# Patient Record
Sex: FEMALE | Race: BLACK OR AFRICAN AMERICAN | Employment: FULL TIME | URBAN - METROPOLITAN AREA
[De-identification: names, ages, dates, MRNs, and addresses within clinical notes are randomized per-mention and may not be internally consistent; named-entity substitution may affect disease eponyms.]

---

## 2017-12-04 ENCOUNTER — OFFICE VISIT (OUTPATIENT)
Dept: INTERNAL MEDICINE CLINIC | Age: 32
End: 2017-12-04

## 2017-12-04 VITALS
SYSTOLIC BLOOD PRESSURE: 124 MMHG | BODY MASS INDEX: 37.05 KG/M2 | OXYGEN SATURATION: 100 % | DIASTOLIC BLOOD PRESSURE: 72 MMHG | HEIGHT: 64 IN | RESPIRATION RATE: 18 BRPM | HEART RATE: 67 BPM | WEIGHT: 217 LBS | TEMPERATURE: 98.2 F

## 2017-12-04 DIAGNOSIS — Z23 ENCOUNTER FOR IMMUNIZATION: ICD-10-CM

## 2017-12-04 DIAGNOSIS — Z00.00 PHYSICAL EXAM, ANNUAL: Primary | ICD-10-CM

## 2017-12-04 LAB
HCG URINE, QL. (POC): POSITIVE
VALID INTERNAL CONTROL?: YES

## 2017-12-04 RX ORDER — NORGESTIMATE AND ETHINYL ESTRADIOL 0.25-0.035
1 KIT ORAL DAILY
COMMUNITY
End: 2022-10-21

## 2017-12-04 NOTE — PROGRESS NOTES
HISTORY OF PRESENT ILLNESS  Crissy Kumar is a 27 y.o. female. HPI   Last here 17. Pt is here for routine care. BP is 124/72    Wt is down 19 lbs since last visit   Congratulated pt on this   Pt states that she is actually up 12 lbs   Pt has been walking in the parking lot at work  Pt has been eating more fruits and vegetables  Discussed continued diet and w/l    Reviewed labs   Discussed her LDL being minimally elevated   Discussed her a1c being in the pre-diabetic ranges  Discussed possibly having an improved LDL and a1c with her recent w/l   Will get labs today     Pt completed a course of vit D 50K weekly  Pt has not started a daily vit D  Discussed low vit D levels contributing to thinning of the bones   Advised her to take vit D OTC 2000U daily     Pt c/o lethargy and menstrual spotting/bleeding  Pt has been taking sprintec birth control   However, pt states that she may be pregnant  Will get pregnancy test today     Pt no longer smokes cigarettes       PREVENTIVE:  Pap: Dr. Melissa Recinos, , appt pending for   Mammogram; not yet needed  Dexa: not yet needed  Tdap: 16  Eye exam:   Flu shot: 17   Lipids:    A1c:  5.7    There are no active problems to display for this patient. Current Outpatient Prescriptions   Medication Sig Dispense Refill    norgestimate-ethinyl estradiol (MONONESSA, 28,) 0.25-35 mg-mcg tab Take 1 Tab by mouth daily.        Past Surgical History:   Procedure Laterality Date    HX  SECTION      HX WISDOM TEETH EXTRACTION        Lab Results  Component Value Date/Time   WBC 6.3 2016 10:15 AM   HGB 11.9 2016 10:15 AM   HCT 36.2 2016 10:15 AM   PLATELET 086 2937 10:15 AM   MCV 89 2016 10:15 AM     Lab Results  Component Value Date/Time   Cholesterol, total 248 2016 10:15 AM   HDL Cholesterol 117 2016 10:15 AM   LDL, calculated 117 2016 10:15 AM   Triglyceride 71 2016 10:15 AM     Lab Results  Component Value Date/Time   GFR est non- 12/02/2016 10:15 AM   GFR est  12/02/2016 10:15 AM   Creatinine 0.71 12/02/2016 10:15 AM   BUN 11 12/02/2016 10:15 AM   Sodium 141 12/02/2016 10:15 AM   Potassium 4.9 12/02/2016 10:15 AM   Chloride 101 12/02/2016 10:15 AM   CO2 25 12/02/2016 10:15 AM          Review of Systems   Constitutional: Positive for malaise/fatigue. Respiratory: Negative for shortness of breath. Cardiovascular: Negative for chest pain. Physical Exam   Constitutional: She is oriented to person, place, and time. She appears well-developed and well-nourished. No distress. HENT:   Head: Normocephalic and atraumatic. Right Ear: External ear normal.   Left Ear: External ear normal.   Mouth/Throat: Oropharynx is clear and moist. No oropharyngeal exudate. Eyes: Conjunctivae and EOM are normal. Pupils are equal, round, and reactive to light. Right eye exhibits no discharge. Left eye exhibits no discharge. No scleral icterus. Neck: Normal range of motion. Neck supple. No carotid bruits    Cardiovascular: Normal rate, regular rhythm, normal heart sounds and intact distal pulses. Exam reveals no gallop and no friction rub. No murmur heard. Pulmonary/Chest: Effort normal and breath sounds normal. No respiratory distress. She has no wheezes. She has no rales. She exhibits no tenderness. Abdominal: Soft. She exhibits no distension and no mass. There is no tenderness. There is no rebound and no guarding. Musculoskeletal: Normal range of motion. She exhibits no edema, tenderness or deformity. Lymphadenopathy:     She has no cervical adenopathy. Neurological: She is alert and oriented to person, place, and time. Coordination normal.   Skin: Skin is warm and dry. No rash noted. She is not diaphoretic. No erythema. No pallor. Psychiatric: She has a normal mood and affect. Her behavior is normal.       ASSESSMENT and PLAN    ICD-10-CM ICD-9-CM    1. Physical exam, annual    Discussed diet and weight loss, she is exercising and wt is improved from lov, congratulated her, continue with w/l, pelvic exam is scheduled, labs today, flu shot today, pregnancy test ordered and positive she has appointment with ob pending. Z00.00 V70.0 LIPID PANEL      METABOLIC PANEL, COMPREHENSIVE      CBC W/O DIFF      TSH 3RD GENERATION      HEMOGLOBIN A1C WITH EAG      URINALYSIS W/ RFLX MICROSCOPIC      VITAMIN D, 25 HYDROXY        Scribed by [address], as dictated by Dr. Joan Townsend. Current diagnosis and concerns discussed with pt at length. Pt understands risks and benefits or current treatment plan and medications, and accepts the treatment and medication with any possible risks. Pt asks appropriate questions, which were answered. Pt was instructed to call with any concerns or problems. This note will not be viewable in [address]

## 2017-12-04 NOTE — MR AVS SNAPSHOT
Visit Information Date & Time Provider Department Dept. Phone Encounter #  
 12/4/2017 10:30 AM Randall Sellers, [address] [phone] [id number] Follow-up Instructions Return in about 1 year (around 12/4/2018). Upcoming Health Maintenance Date Due Influenza Age 5 to Adult 8/1/2017 PAP AKA CERVICAL CYTOLOGY 1/1/2019 DTaP/Tdap/Td series (2 - Td) 12/2/2026 Allergies as of 12/4/2017  Review Complete On: 12/4/2017 By: Randall Sellers MD  
 No Known Allergies Current Immunizations  Never Reviewed Name Date Influenza Vaccine (Quad) PF 12/2/2016 Tdap 12/2/2016 Not reviewed this visit You Were Diagnosed With   
  
 Codes Comments Physical exam, annual    -  Primary ICD-10-CM: Z00.00 ICD-9-CM: V70.0 Vitals BP Pulse Temp Resp Height(growth percentile) Weight(growth percentile) 124/72 (BP 1 Location: Left arm, BP Patient Position: Sitting) 67 98.2 °F (36.8 °C) (Oral) 18 5' 3.5\" (1.613 m) 217 lb (98.4 kg) SpO2 BMI OB Status Smoking Status 100% 37.84 kg/m2 Having regular periods Former Smoker Vitals History BMI and BSA Data Body Mass Index Body Surface Area  
 37.84 kg/m 2 2.1 m 2 Preferred Pharmacy Pharmacy Name Phone [facility] ""[address]  [place] [phone] Your Updated Medication List  
  
   
This list is accurate as of: 12/4/17 10:37 AM.  Always use your most recent med list.  
  
  
  
  
 Abel Méndez (28) 0.25-35 mg-mcg Tab Generic drug:  norgestimate-ethinyl estradiol Take 1 Tab by mouth daily. We Performed the Following CBC W/O DIFF [59643 CPT(R)] HEMOGLOBIN A1C WITH EAG [63378 CPT(R)] LIPID PANEL [70157 CPT(R)] METABOLIC PANEL, COMPREHENSIVE [74199 CPT(R)] TSH 3RD GENERATION [18982 CPT(R)] URINALYSIS W/ RFLX MICROSCOPIC [97030 CPT(R)] VITAMIN D, 25 HYDROXY [id number] CPT(R)] Follow-up Instructions Return in about 1 year (around 12/4/2018). Patient Instructions Daily vitamin D 2000units per day Labs today Introducing [facility]! Dear Vielka Frazier: Thank you for requesting a Freever account. Our records indicate that you already have an active Freever account. You can access your account anytime at [email or web address]. [email or web address] Did you know that you can access your hospital and ER discharge instructions at any time in [employer]ver? You can also review all of your test results from your hospital stay or ER visit. Additional Information If you have questions, please visit the Frequently Asked Questions section of the [employer]ver website at [email or web address] Remember, [employer]ver is NOT to be used for urgent needs. For medical emergencies, dial 911. Now available from your iPhone and Android! Please provide this summary of care documentation to your next provider. Your primary care clinician is listed as Darinel Whitman. If you have any questions after today's visit, please call [phone].

## 2017-12-05 LAB
25(OH)D3+25(OH)D2 SERPL-MCNC: 16.1 NG/ML (ref 30–100)
ALBUMIN SERPL-MCNC: 3.9 G/DL (ref 3.5–5.5)
ALBUMIN/GLOB SERPL: 1.3 {RATIO} (ref 1.2–2.2)
ALP SERPL-CCNC: 52 IU/L (ref 39–117)
ALT SERPL-CCNC: 12 IU/L (ref 0–32)
APPEARANCE UR: CLEAR
AST SERPL-CCNC: 16 IU/L (ref 0–40)
BACTERIA #/AREA URNS HPF: ABNORMAL /[HPF]
BILIRUB SERPL-MCNC: 0.4 MG/DL (ref 0–1.2)
BILIRUB UR QL STRIP: NEGATIVE
BUN SERPL-MCNC: 8 MG/DL (ref 6–20)
BUN/CREAT SERPL: 10 (ref 9–23)
CALCIUM SERPL-MCNC: 9.2 MG/DL (ref 8.7–10.2)
CASTS URNS QL MICRO: ABNORMAL /LPF
CHLORIDE SERPL-SCNC: 102 MMOL/L (ref 96–106)
CHOLEST SERPL-MCNC: 276 MG/DL (ref 100–199)
CO2 SERPL-SCNC: 22 MMOL/L (ref 18–29)
COLOR UR: YELLOW
CREAT SERPL-MCNC: 0.79 MG/DL (ref 0.57–1)
CRYSTALS URNS MICRO: ABNORMAL
EPI CELLS #/AREA URNS HPF: ABNORMAL /HPF
ERYTHROCYTE [DISTWIDTH] IN BLOOD BY AUTOMATED COUNT: 13.6 % (ref 12.3–15.4)
EST. AVERAGE GLUCOSE BLD GHB EST-MCNC: 103 MG/DL
GFR SERPLBLD CREATININE-BSD FMLA CKD-EPI: 113 ML/MIN/1.73
GFR SERPLBLD CREATININE-BSD FMLA CKD-EPI: 98 ML/MIN/1.73
GLOBULIN SER CALC-MCNC: 3 G/DL (ref 1.5–4.5)
GLUCOSE SERPL-MCNC: 86 MG/DL (ref 65–99)
GLUCOSE UR QL: NEGATIVE
HBA1C MFR BLD: 5.2 % (ref 4.8–5.6)
HCT VFR BLD AUTO: 36.8 % (ref 34–46.6)
HDLC SERPL-MCNC: 113 MG/DL
HGB BLD-MCNC: 12.3 G/DL (ref 11.1–15.9)
HGB UR QL STRIP: NEGATIVE
KETONES UR QL STRIP: NEGATIVE
LDLC SERPL CALC-MCNC: 150 MG/DL (ref 0–99)
LEUKOCYTE ESTERASE UR QL STRIP: ABNORMAL
MCH RBC QN AUTO: 30 PG (ref 26.6–33)
MCHC RBC AUTO-ENTMCNC: 33.4 G/DL (ref 31.5–35.7)
MCV RBC AUTO: 90 FL (ref 79–97)
MICRO URNS: ABNORMAL
MUCOUS THREADS URNS QL MICRO: PRESENT
NITRITE UR QL STRIP: NEGATIVE
PH UR STRIP: 5.5 [PH] (ref 5–7.5)
PLATELET # BLD AUTO: 398 X10E3/UL (ref 150–379)
POTASSIUM SERPL-SCNC: 5 MMOL/L (ref 3.5–5.2)
PROT SERPL-MCNC: 6.9 G/DL (ref 6–8.5)
PROT UR QL STRIP: NEGATIVE
RBC # BLD AUTO: 4.1 X10E6/UL (ref 3.77–5.28)
RBC #/AREA URNS HPF: ABNORMAL /HPF
SODIUM SERPL-SCNC: 138 MMOL/L (ref 134–144)
SP GR UR: 1.02 (ref 1–1.03)
TRIGL SERPL-MCNC: 67 MG/DL (ref 0–149)
TSH SERPL DL<=0.005 MIU/L-ACNC: 1.07 UIU/ML (ref 0.45–4.5)
UNIDENT CRYS URNS QL MICRO: PRESENT
UROBILINOGEN UR STRIP-MCNC: 0.2 MG/DL (ref 0.2–1)
VLDLC SERPL CALC-MCNC: 13 MG/DL (ref 5–40)
WBC # BLD AUTO: 6.1 X10E3/UL (ref 3.4–10.8)
WBC #/AREA URNS HPF: ABNORMAL /HPF

## 2017-12-06 RX ORDER — ERGOCALCIFEROL 1.25 MG/1
50000 CAPSULE ORAL
Qty: 8 CAP | Refills: 0 | Status: SHIPPED | OUTPATIENT
Start: 2017-12-06 | End: 2022-10-21

## 2017-12-06 NOTE — PROGRESS NOTES
Called, spoke to pt. Two pt identifiers confirmed. Pt informed per Dr. Juan Masters lipids elevated-work on diet/wt loss. Pt informed per Dr. Juan Masters vit D is low--the patient needs 50,000 units weekly for 8 weeks then start a daily 2000unit supplement instead. Pt informed rest of labs stable. Pt verbalized understanding of information discussed w/ no further questions at this time.

## 2019-04-14 ENCOUNTER — HOSPITAL ENCOUNTER (EMERGENCY)
Age: 34
Discharge: HOME | End: 2019-04-14
Attending: EMERGENCY MEDICINE
Payer: COMMERCIAL

## 2019-04-14 VITALS
TEMPERATURE: 98.7 F | SYSTOLIC BLOOD PRESSURE: 148 MMHG | RESPIRATION RATE: 19 BRPM | DIASTOLIC BLOOD PRESSURE: 82 MMHG | HEART RATE: 72 BPM | WEIGHT: 264.55 LBS | BODY MASS INDEX: 48.68 KG/M2 | OXYGEN SATURATION: 98 % | HEIGHT: 62 IN

## 2019-04-14 DIAGNOSIS — Z77.29 CARBON MONOXIDE EXPOSURE: Primary | ICD-10-CM

## 2019-04-14 LAB
ALBUMIN SERPL-MCNC: 2.3 G/DL (ref 3.5–5)
ALBUMIN/GLOB SERPL: 0.6 {RATIO} (ref 1.1–2.2)
ALP SERPL-CCNC: 112 U/L (ref 45–117)
ALT SERPL-CCNC: 9 U/L (ref 12–78)
ANION GAP SERPL CALC-SCNC: 7 MMOL/L (ref 5–15)
AST SERPL-CCNC: 15 U/L (ref 15–37)
BILIRUB SERPL-MCNC: 0.4 MG/DL (ref 0.2–1)
BUN SERPL-MCNC: 9 MG/DL (ref 6–20)
BUN/CREAT SERPL: 13 (ref 12–20)
CALCIUM SERPL-MCNC: 8.6 MG/DL (ref 8.5–10.1)
CHLORIDE SERPL-SCNC: 110 MMOL/L (ref 97–108)
CO2 SERPL-SCNC: 23 MMOL/L (ref 21–32)
COHGB MFR BLD: 1.4 % (ref 1–2)
COMMENT, HOLDF: NORMAL
CREAT SERPL-MCNC: 0.7 MG/DL (ref 0.55–1.02)
GLOBULIN SER CALC-MCNC: 3.8 G/DL (ref 2–4)
GLUCOSE SERPL-MCNC: 81 MG/DL (ref 65–100)
HGB BLD OXIMETRY-MCNC: 12.3 G/DL (ref 14–17)
METHGB MFR BLD: 0.5 % (ref 0–1.4)
OXYHGB MFR BLD: 76.8 % (ref 94–97)
POTASSIUM SERPL-SCNC: 4 MMOL/L (ref 3.5–5.1)
PROT SERPL-MCNC: 6.1 G/DL (ref 6.4–8.2)
SAMPLES BEING HELD,HOLD: NORMAL
SAO2 % BLD: 78 % (ref 95–99)
SODIUM SERPL-SCNC: 140 MMOL/L (ref 136–145)

## 2019-04-14 PROCEDURE — 99284 EMERGENCY DEPT VISIT MOD MDM: CPT

## 2019-04-14 PROCEDURE — 36415 COLL VENOUS BLD VENIPUNCTURE: CPT

## 2019-04-14 PROCEDURE — 80053 COMPREHEN METABOLIC PANEL: CPT

## 2019-04-14 PROCEDURE — 77010033678 HC OXYGEN DAILY

## 2019-04-14 PROCEDURE — 82375 ASSAY CARBOXYHB QUANT: CPT

## 2019-04-14 NOTE — ED PROVIDER NOTES
EMERGENCY DEPARTMENT HISTORY AND PHYSICAL EXAM 
 
 
Date: 2019 Patient Name: Evelyne Jane History of Presenting Illness Chief Complaint Patient presents with  Chemical exposure Patient is 38 weeks pregnant and states she was exposed to carbon monoxide yesterday after leaving the gas stove on all night. Patient in no distress and denies pregnancy related issues History Provided By: Patient HPI: Evelyne Jane, 26 y.o. female with PMHx significant for 38-week pregnancy, presents to the ED with cc of possible carbon monoxide exposure. Patient reports using her natural gas oven last night, accidentally left the appliance on all evening. Her significant other enter the house this morning, 2[address] square foot home with split level, and noticed a strong smell of gas. Patient denies any symptoms, but wanted to get checked out for possible CO exposure. She specifically denies any headache, shortness of breath, does note fetal movement this morning, and denies any recent fever or illness. She has some urinary frequency related to the pregnancy, and some mild lower extremity edema, also consistent with her pregnancy. He is a  at without any complications thus far. Her  is scheduled for this Friday. There are no other complaints, changes, or physical findings at this time. PCP: Junior Suzanna MD 
 
No current facility-administered medications on file prior to encounter. Current Outpatient Medications on File Prior to Encounter Medication Sig Dispense Refill  ergocalciferol (ERGOCALCIFEROL) 50,000 unit capsule Take 1 Cap by mouth every seven (7) days. 8 Cap 0  
 norgestimate-ethinyl estradiol (MONONESSA, 28,) 0.25-35 mg-mcg tab Take 1 Tab by mouth daily. Past History Past Medical History: No past medical history on file. Past Surgical History: 
Past Surgical History:  
Procedure Laterality Date  HX  SECTION    HX WISDOM TEETH EXTRACTION Family History: 
Family History Problem Relation Age of Onset  Heart Disease Maternal Grandmother  Cancer Paternal Grandmother   
     throat Social History: 
Social History Tobacco Use  Smoking status: Former Smoker Last attempt to quit: 2011 Years since quittin.2  Smokeless tobacco: Current User Substance Use Topics  Alcohol use: Yes Comment: socially  Drug use: No  
 
 
Allergies: 
No Known Allergies Review of Systems Review of Systems Constitutional: Negative for chills and fever. HENT: Negative for congestion. Eyes: Negative for visual disturbance. Respiratory: Negative for chest tightness. Cardiovascular: Negative for chest pain and leg swelling. Gastrointestinal: Negative for abdominal pain and vomiting. Endocrine: Negative for polyuria. Genitourinary: Negative for dysuria and frequency. Musculoskeletal: Negative for myalgias. Skin: Negative for color change. Allergic/Immunologic: Negative for immunocompromised state. Neurological: Negative for numbness. Physical Exam  
Physical Exam  
 
Nursing note and vitals reviewed. General appearance: non-toxic, smiling, no distress Eyes: PERRL, EOMI, conjunctiva normal, anicteric sclera HEENT: mucous membranes moist, oropharynx is clear Pulmonary: clear to auscultation bilaterally Cardiac: normal rate and regular rhythm, no murmurs, gallops, or rubs, 2+DP pulses, 2+ radial pulses Abdomen: soft, nontender, gravid, bowel sounds present MSK: Trace to 1+ pre-tibial edema Neuro: Alert, answers questions appropriately, face symmetric, moves all extremities Skin: capillary refill brisk Diagnostic Study Results Labs - Recent Results (from the past 12 hour(s)) METABOLIC PANEL, COMPREHENSIVE Collection Time: 19 10:10 AM  
Result Value Ref Range Sodium 140 136 - 145 mmol/L  Potassium 4.0 3.5 - 5.1 mmol/L  
 Chloride 110 (H) 97 - 108 mmol/L  
 CO2 23 21 - 32 mmol/L Anion gap 7 5 - 15 mmol/L Glucose 81 65 - 100 mg/dL BUN 9 6 - 20 MG/DL Creatinine 0.70 0.55 - 1.02 MG/DL  
 BUN/Creatinine ratio 13 12 - 20 GFR est AA >60 >60 ml/min/1.73m2 GFR est non-AA >60 >60 ml/min/1.73m2 Calcium 8.6 8.5 - 10.1 MG/DL Bilirubin, total 0.4 0.2 - 1.0 MG/DL  
 ALT (SGPT) 9 (L) 12 - 78 U/L  
 AST (SGOT) 15 15 - 37 U/L Alk. phosphatase 112 45 - 117 U/L Protein, total 6.1 (L) 6.4 - 8.2 g/dL Albumin 2.3 (L) 3.5 - 5.0 g/dL Globulin 3.8 2.0 - 4.0 g/dL A-G Ratio 0.6 (L) 1.1 - 2.2 SAMPLES BEING HELD Collection Time: 04/14/19 10:10 AM  
Result Value Ref Range SAMPLES BEING HELD 1RED 1LAV   
 COMMENT Add-on orders for these samples will be processed based on acceptable specimen integrity and analyte stability, which may vary by analyte. CARBOXY HEMOGLOBIN Collection Time: 04/14/19 10:20 AM  
Result Value Ref Range Carboxy-Hgb 1.4 1 - 2 % Methemoglobin 0.5 0 - 1.4 %  
 tHb 12.3 (L) 14 - 17 g/dL Oxyhemoglobin 76.8 (LL) 94 - 97 % O2 SATURATION 78 (L) 95 - 99 % Radiologic Studies - No orders to display CT Results  (Last 48 hours) None CXR Results  (Last 48 hours) None Medical Decision Making I am the first provider for this patient. I reviewed the vital signs, available nursing notes, past medical history, past surgical history, family history and social history. Vital Signs-Reviewed the patient's vital signs. Patient Vitals for the past 12 hrs: 
 Temp Pulse Resp BP SpO2  
04/14/19 1109     98 % 04/14/19 1100  72 19 148/82   
04/14/19 1015  69 18  100 % 04/14/19 1000  66 17 132/69 100 % 04/14/19 0939 98.7 °F (37.1 °C) 77 14 147/82 99 % Pulse Oximetry Analysis - 98% on room air. Cardiac Monitor:  
Rate: 77 bpm 
Rhythm: Normal Sinus Rhythm Records Reviewed: Nursing Notes Provider Notes (Medical Decision Making): Third trimester pregnant female with possible carbon monoxide exposure. Patient is asymptomatic without headache, shortness of breath, or vomiting. Will check CO level, anticipate discharge if level negative. ED Course:  
Initial assessment performed. The patients presenting problems have been discussed, and they are in agreement with the care plan formulated and outlined with them. I have encouraged them to ask questions as they arise throughout their visit. ED Course as of Apr 14 1135 Sun Apr 14, 2019  
1029 RT called to notify of a normal carboxyhemoglobin level obtained off of her venous sample. [FH] ED Course User Index [FH] Jovany Hernandez., MD  
 
Reassessment @ 11:01: 
Bedside ultrasound demonstrates fetal cardiac activity with a rate ~144 bpm. 
 
 
Critical Care Time:  
0 Disposition: 
Discharge Note: 
11:36 AM 
The patient has been re-evaluated and is ready for discharge. Reviewed available results with patient. Counseled patient on diagnosis and care plan. Patient has expressed understanding, and all questions have been answered. Patient agrees with plan and agrees to follow up as recommended, or to return to the ED if their symptoms worsen. Discharge instructions have been provided and explained to the patient, along with reasons to return to the ED. PLAN: 
1. Current Discharge Medication List  
  
CONTINUE these medications which have NOT CHANGED Details  
ergocalciferol (ERGOCALCIFEROL) 50,000 unit capsule Take 1 Cap by mouth every seven (7) days. Qty: 8 Cap, Refills: 0  
  
norgestimate-ethinyl estradiol (MONONESSA, 28,) 0.25-35 mg-mcg tab Take 1 Tab by mouth daily. 2.  
Follow-up Information Follow up With Specialties Details Why Contact Info Aayush Aldridge MD Internal Medicine Schedule an appointment as soon as possible for a visit in 1 week As needed Ana Shelton [address] [facility] [address] Kenroy Escalera 
[phone] [facility] EMERGENCY DEPT Emergency Medicine Go in 1 day IF YOU DEVELOP ANY SYMPTOMS [address] [facility] 
[phone] Return to ED if worse Diagnosis Clinical Impression: 1. Carbon monoxide exposure Attestations:

## 2019-04-14 NOTE — ED NOTES
Alonso Hendricks MD reviewed discharge instructions with the patient. The patient verbalized understanding. Ambulatory on discharge.

## 2019-04-14 NOTE — DISCHARGE INSTRUCTIONS
Patient Education        Exposure to Toxins: Care Instructions-YOUR CARBON MONOXIDE LEVEL IS NORMAL. YOU DID NOT SUSTAIN AN EXPOSURE TO THIS GAS. Your Care Instructions    Toxins are poisonous substances that can harm your body. If your doctor is concerned that your symptoms are caused by exposure to a toxic substance, he or she may ask you about your home, your work, your family, and other aspects of your environment. You also may have blood tests or X-rays to find out if a toxin is in your body. For example, you may have been around smoke from a fire. Or you may have been around fumes from paints, solvents, or waste products from workshops or factories. But in some cases it may be hard to find out what you may have been exposed to. Sometimes it can take years before you have symptoms. 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 And being exposed to some toxins can make health problems you already have worse. Follow-up care is a key part of your treatment and safety. Be sure to make and go to all appointments, and call your doctor if you are having problems. It's also a good idea to know your test results and keep a list of the medicines you take. How can you care for yourself at home? · If you think you may have been exposed to a toxin but are not sure what it might be, try to keep a written record of your symptoms. Note when and where you have symptoms. And note any possible health hazards. For example, you may find out that you feel sick to your stomach during your workweek, but you feel better on weekends. · It may help to increase the amount of fresh air in your home. · If you can, try to control your exposure to hazardous materials. ? Avoid cigarette smoke. Cigarettes contain many chemicals that are hazardous to your health. ? Keep your home clean and as free from dust as you can. Dust can irritate your lungs. ? Get a carbon monoxide alarm for your home.  Carbon monoxide comes from cars, space heaters, and other heat sources. It can be deadly. ? When you use cleaning or home improvement products, make sure to open windows or use an exhaust fan. Never mix household chemicals, such as chlorine and ammonia. Some mixtures can create toxic fumes that can be deadly. ? Read the label on house and garden chemicals. Be sure to follow all safety directions. Try to limit your use of lawn and garden pesticides. ? Keep in mind that the farther away you are from a hazardous source, the less exposure you will receive. When should you call for help? Call 911 anytime you think you may need emergency care. For example, call if:    · You have trouble breathing.    Call your doctor now or seek immediate medical care if:    · You get household chemicals in your mouth or eyes. Call the [address] [place] [address] ([phone]).     · You think you may have been exposed to a hazardous material.    Watch closely for changes in your health, and be sure to contact your doctor if:    · You do not get better as expected. Where can you learn more? Go to http://mitesh-joseph.info/. Enter B226 in the search box to learn more about \"Exposure to Toxins: Care Instructions. \"  Current as of: March 28, 2018  Content Version: 11.9  © 1166-5372 [employer]. Care instructions adapted under license by [employer] (which disclaims liability or warranty for this information). If you have questions about a medical condition or this instruction, always ask your healthcare professional. [place] [address] any warranty or liability for your use of this information.

## 2021-06-08 ENCOUNTER — TELEPHONE (OUTPATIENT)
Dept: INTERNAL MEDICINE CLINIC | Age: 36
End: 2021-06-08

## 2021-06-08 NOTE — TELEPHONE ENCOUNTER
[phone]      Pt called requesting an annual physical    Pt last seen on 12/4/17. Pt needs to re-establish as a new patient (past 3 years)    No available new patient appointments until September. September appts that are not scheduled already are \"admin hold\" and I am unable to schedule them. Please call pt to advise when they can be scheduled.

## 2021-06-10 NOTE — TELEPHONE ENCOUNTER
Pt states that she will try scheduling with another office due to not wanting to wait until Sept for NP appt.

## 2022-07-04 ENCOUNTER — HOSPITAL ENCOUNTER (EMERGENCY)
Age: 37
Discharge: HOME | End: 2022-07-04
Attending: EMERGENCY MEDICINE
Payer: COMMERCIAL

## 2022-07-04 VITALS
SYSTOLIC BLOOD PRESSURE: 140 MMHG | OXYGEN SATURATION: 98 % | WEIGHT: 195.11 LBS | BODY MASS INDEX: 33.31 KG/M2 | HEIGHT: 64 IN | RESPIRATION RATE: 18 BRPM | HEART RATE: 64 BPM | DIASTOLIC BLOOD PRESSURE: 70 MMHG | TEMPERATURE: 98.3 F

## 2022-07-04 DIAGNOSIS — R11.2 NAUSEA AND VOMITING, UNSPECIFIED VOMITING TYPE: Primary | ICD-10-CM

## 2022-07-04 DIAGNOSIS — K29.00 ACUTE GASTRITIS WITHOUT HEMORRHAGE, UNSPECIFIED GASTRITIS TYPE: ICD-10-CM

## 2022-07-04 LAB
ALBUMIN SERPL-MCNC: 4.1 G/DL (ref 3.5–5)
ALBUMIN/GLOB SERPL: 1.1 {RATIO} (ref 1.1–2.2)
ALP SERPL-CCNC: 55 U/L (ref 45–117)
ALT SERPL-CCNC: 21 U/L (ref 12–78)
AMPHET UR QL SCN: POSITIVE
ANION GAP SERPL CALC-SCNC: 7 MMOL/L (ref 5–15)
APPEARANCE UR: CLEAR
AST SERPL-CCNC: 27 U/L (ref 15–37)
BACTERIA URNS QL MICRO: NEGATIVE /HPF
BARBITURATES UR QL SCN: NEGATIVE
BASOPHILS # BLD: 0 K/UL (ref 0–0.1)
BASOPHILS NFR BLD: 0 % (ref 0–1)
BENZODIAZ UR QL: NEGATIVE
BILIRUB DIRECT SERPL-MCNC: 0.2 MG/DL (ref 0–0.2)
BILIRUB SERPL-MCNC: 0.7 MG/DL (ref 0.2–1)
BILIRUB UR QL: NEGATIVE
BUN SERPL-MCNC: 13 MG/DL (ref 6–20)
BUN/CREAT SERPL: 18 (ref 12–20)
CALCIUM SERPL-MCNC: 9.6 MG/DL (ref 8.5–10.1)
CANNABINOIDS UR QL SCN: POSITIVE
CHLORIDE SERPL-SCNC: 105 MMOL/L (ref 97–108)
CO2 SERPL-SCNC: 24 MMOL/L (ref 21–32)
COCAINE UR QL SCN: NEGATIVE
COLOR UR: ABNORMAL
CREAT SERPL-MCNC: 0.74 MG/DL (ref 0.55–1.02)
DIFFERENTIAL METHOD BLD: ABNORMAL
DRUG SCRN COMMENT,DRGCM: ABNORMAL
EOSINOPHIL # BLD: 0 K/UL (ref 0–0.4)
EOSINOPHIL NFR BLD: 0 % (ref 0–7)
EPITH CASTS URNS QL MICRO: ABNORMAL /LPF
ERYTHROCYTE [DISTWIDTH] IN BLOOD BY AUTOMATED COUNT: 12.8 % (ref 11.5–14.5)
GLOBULIN SER CALC-MCNC: 3.8 G/DL (ref 2–4)
GLUCOSE SERPL-MCNC: 116 MG/DL (ref 65–100)
GLUCOSE UR STRIP.AUTO-MCNC: NEGATIVE MG/DL
HCT VFR BLD AUTO: 37.5 % (ref 35–47)
HGB BLD-MCNC: 12.4 G/DL (ref 11.5–16)
HGB UR QL STRIP: ABNORMAL
HYALINE CASTS URNS QL MICRO: ABNORMAL /LPF (ref 0–2)
IMM GRANULOCYTES # BLD AUTO: 0 K/UL (ref 0–0.04)
IMM GRANULOCYTES NFR BLD AUTO: 0 % (ref 0–0.5)
KETONES UR QL STRIP.AUTO: 40 MG/DL
LEUKOCYTE ESTERASE UR QL STRIP.AUTO: NEGATIVE
LIPASE SERPL-CCNC: 55 U/L (ref 73–393)
LYMPHOCYTES # BLD: 1 K/UL (ref 0.8–3.5)
LYMPHOCYTES NFR BLD: 12 % (ref 12–49)
MCH RBC QN AUTO: 30 PG (ref 26–34)
MCHC RBC AUTO-ENTMCNC: 33.1 G/DL (ref 30–36.5)
MCV RBC AUTO: 90.6 FL (ref 80–99)
METHADONE UR QL: NEGATIVE
MONOCYTES # BLD: 0.2 K/UL (ref 0–1)
MONOCYTES NFR BLD: 3 % (ref 5–13)
NEUTS SEG # BLD: 7 K/UL (ref 1.8–8)
NEUTS SEG NFR BLD: 85 % (ref 32–75)
NITRITE UR QL STRIP.AUTO: NEGATIVE
NRBC # BLD: 0 K/UL (ref 0–0.01)
NRBC BLD-RTO: 0 PER 100 WBC
OPIATES UR QL: NEGATIVE
PCP UR QL: NEGATIVE
PH UR STRIP: 5.5 [PH] (ref 5–8)
PLATELET # BLD AUTO: 337 K/UL (ref 150–400)
PMV BLD AUTO: 8.9 FL (ref 8.9–12.9)
POTASSIUM SERPL-SCNC: 4.1 MMOL/L (ref 3.5–5.1)
PROT SERPL-MCNC: 7.9 G/DL (ref 6.4–8.2)
PROT UR STRIP-MCNC: 30 MG/DL
RBC # BLD AUTO: 4.14 M/UL (ref 3.8–5.2)
RBC #/AREA URNS HPF: ABNORMAL /HPF (ref 0–5)
SODIUM SERPL-SCNC: 136 MMOL/L (ref 136–145)
SP GR UR REFRACTOMETRY: 1.03 (ref 1–1.03)
UA: UC IF INDICATED,UAUC: ABNORMAL
UROBILINOGEN UR QL STRIP.AUTO: 1 EU/DL (ref 0.2–1)
WBC # BLD AUTO: 8.2 K/UL (ref 3.6–11)
WBC URNS QL MICRO: ABNORMAL /HPF (ref 0–4)

## 2022-07-04 PROCEDURE — 80076 HEPATIC FUNCTION PANEL: CPT

## 2022-07-04 PROCEDURE — 85025 COMPLETE CBC W/AUTO DIFF WBC: CPT

## 2022-07-04 PROCEDURE — 96374 THER/PROPH/DIAG INJ IV PUSH: CPT

## 2022-07-04 PROCEDURE — 36415 COLL VENOUS BLD VENIPUNCTURE: CPT

## 2022-07-04 PROCEDURE — 83690 ASSAY OF LIPASE: CPT

## 2022-07-04 PROCEDURE — 80048 BASIC METABOLIC PNL TOTAL CA: CPT

## 2022-07-04 PROCEDURE — 96376 TX/PRO/DX INJ SAME DRUG ADON: CPT

## 2022-07-04 PROCEDURE — 80307 DRUG TEST PRSMV CHEM ANLYZR: CPT

## 2022-07-04 PROCEDURE — 99284 EMERGENCY DEPT VISIT MOD MDM: CPT

## 2022-07-04 PROCEDURE — 81001 URINALYSIS AUTO W/SCOPE: CPT

## 2022-07-04 PROCEDURE — 96375 TX/PRO/DX INJ NEW DRUG ADDON: CPT

## 2022-07-04 PROCEDURE — 74011250636 HC RX REV CODE- 250/636: Performed by: EMERGENCY MEDICINE

## 2022-07-04 RX ORDER — ONDANSETRON 2 MG/ML
4 INJECTION INTRAMUSCULAR; INTRAVENOUS
Status: COMPLETED | OUTPATIENT
Start: 2022-07-04 | End: 2022-07-04

## 2022-07-04 RX ORDER — FAMOTIDINE 20 MG/1
20 TABLET, FILM COATED ORAL 2 TIMES DAILY
Qty: 20 TABLET | Refills: 0 | Status: SHIPPED | OUTPATIENT
Start: 2022-07-04 | End: 2022-07-14

## 2022-07-04 RX ORDER — ONDANSETRON 4 MG/1
4 TABLET, FILM COATED ORAL
Qty: 20 TABLET | Refills: 0 | Status: SHIPPED | OUTPATIENT
Start: 2022-07-04 | End: 2022-10-21

## 2022-07-04 RX ORDER — FAMOTIDINE 10 MG/ML
20 INJECTION INTRAVENOUS
Status: COMPLETED | OUTPATIENT
Start: 2022-07-04 | End: 2022-07-04

## 2022-07-04 RX ADMIN — SODIUM CHLORIDE 1000 ML: 9 INJECTION, SOLUTION INTRAVENOUS at 01:32

## 2022-07-04 RX ADMIN — FAMOTIDINE 20 MG: 10 INJECTION INTRAVENOUS at 01:31

## 2022-07-04 RX ADMIN — ONDANSETRON 4 MG: 2 INJECTION INTRAMUSCULAR; INTRAVENOUS at 01:31

## 2022-07-04 RX ADMIN — ONDANSETRON 4 MG: 2 INJECTION INTRAMUSCULAR; INTRAVENOUS at 04:48

## 2022-07-04 NOTE — ED PROVIDER NOTES
EMERGENCY DEPARTMENT HISTORY AND PHYSICAL EXAM      Date: 2022  Patient Name: Lucila Mitchell    History of Presenting Illness     Chief Complaint   Patient presents with    Vomiting     Pt states she went out saturday night had 4 drinks and has been vomitting since 8am, shes ttes she is unable to keep down any foods or fluids,        History Provided By: Patient    HPI: Lucila Mitchell, 43 y.o. female with PMHx significant for anxiety presents to the ED with nausea, vomiting, and generalized abdominal pain that started this morning at 9 AM.  Patient went out last night and had 4 drinks. She felt okay this morning when she first woke up but about 9 AM started having symptoms. She states she has vomited about 10 times today and had a few loose stools. Denies any blood in her emesis or stools. She reports chills but denies any fever. States her abdominal pain moderate and located in  her left upper quadrant and epigastric area. There are no exacerbating or relieving factors. She has not taken anything for her symptoms. Does not remember her last menstrual cycle. States she thinks it has been years and she has an IUD in place. She admits to vaping. Denies any drug use including marijuana. Reports drinking 4-5 alcoholic beverages weekly. She recently started Wellbutrin within the past week to help with her anxiety. PCP: Garret Morrison MD    No current facility-administered medications on file prior to encounter. Current Outpatient Medications on File Prior to Encounter   Medication Sig Dispense Refill    ergocalciferol (ERGOCALCIFEROL) 50,000 unit capsule Take 1 Cap by mouth every seven (7) days. 8 Cap 0    norgestimate-ethinyl estradiol (MONONESSA, 28,) 0.25-35 mg-mcg tab Take 1 Tab by mouth daily. Past History     Past Medical History:  No past medical history on file.     Past Surgical History:  Past Surgical History:   Procedure Laterality Date    HX  SECTION     HX WISDOM TEETH EXTRACTION         Family History:  Family History   Problem Relation Age of Onset    Heart Disease Maternal Grandmother     Cancer Paternal Grandmother         throat        Social History:  Social History     Tobacco Use    Smoking status: Former Smoker     Quit date:      Years since quittin.5    Smokeless tobacco: Current User   Substance Use Topics    Alcohol use: Yes     Comment: socially     Drug use: No       Allergies:  No Known Allergies      Review of Systems   Review of Systems   Constitutional: Positive for chills. Negative for fever. HENT: Negative for congestion, ear pain, rhinorrhea and sore throat. Eyes: Negative. Respiratory: Negative for cough, chest tightness and shortness of breath. Cardiovascular: Negative for chest pain and palpitations. Gastrointestinal: Positive for abdominal pain, diarrhea, nausea and vomiting. Negative for constipation. Genitourinary: Negative for dysuria, flank pain and hematuria. Musculoskeletal: Negative for back pain, myalgias and neck pain. Skin: Negative for rash and wound. Neurological: Negative for light-headedness and headaches. Psychiatric/Behavioral: Negative for confusion. The patient is nervous/anxious. All other systems reviewed and are negative.         Physical Exam    General appearance -overweight, well appearing, and in no distress  Eyes - pupils equal and reactive, extraocular eye movements intact  ENT - mucous membranes moist, pharynx normal without lesions  Neck - supple, no significant adenopathy; non-tender to palpation  Chest - clear to auscultation, no wheezes, rales or rhonchi; non-tender to palpation  Heart - normal rate and regular rhythm, S1 and S2 normal, no murmurs noted  Abdomen - soft, epigastric and left upper quadrant tenderness, no rebound or guarding, nondistended, no masses or organomegaly  Musculoskeletal - no joint tenderness, deformity or swelling; normal ROM  Extremities - peripheral pulses normal, no pedal edema  Skin - normal coloration and turgor, no rashes  Neurological - alert, oriented x3, normal speech, no focal findings or movement disorder noted    Diagnostic Study Results     Labs -     Recent Results (from the past 12 hour(s))   METABOLIC PANEL, BASIC    Collection Time: 07/04/22 12:23 AM   Result Value Ref Range    Sodium 136 136 - 145 mmol/L    Potassium 4.1 3.5 - 5.1 mmol/L    Chloride 105 97 - 108 mmol/L    CO2 24 21 - 32 mmol/L    Anion gap 7 5 - 15 mmol/L    Glucose 116 (H) 65 - 100 mg/dL    BUN 13 6 - 20 MG/DL    Creatinine 0.74 0.55 - 1.02 MG/DL    BUN/Creatinine ratio 18 12 - 20      GFR est AA >60 >60 ml/min/1.73m2    GFR est non-AA >60 >60 ml/min/1.73m2    Calcium 9.6 8.5 - 10.1 MG/DL   CBC WITH AUTOMATED DIFF    Collection Time: 07/04/22 12:23 AM   Result Value Ref Range    WBC 8.2 3.6 - 11.0 K/uL    RBC 4.14 3.80 - 5.20 M/uL    HGB 12.4 11.5 - 16.0 g/dL    HCT 37.5 35.0 - 47.0 %    MCV 90.6 80.0 - 99.0 FL    MCH 30.0 26.0 - 34.0 PG    MCHC 33.1 30.0 - 36.5 g/dL    RDW 12.8 11.5 - 14.5 %    PLATELET 255 511 - 359 K/uL    MPV 8.9 8.9 - 12.9 FL    NRBC 0.0 0  WBC    ABSOLUTE NRBC 0.00 0.00 - 0.01 K/uL    NEUTROPHILS 85 (H) 32 - 75 %    LYMPHOCYTES 12 12 - 49 %    MONOCYTES 3 (L) 5 - 13 %    EOSINOPHILS 0 0 - 7 %    BASOPHILS 0 0 - 1 %    IMMATURE GRANULOCYTES 0 0.0 - 0.5 %    ABS. NEUTROPHILS 7.0 1.8 - 8.0 K/UL    ABS. LYMPHOCYTES 1.0 0.8 - 3.5 K/UL    ABS. MONOCYTES 0.2 0.0 - 1.0 K/UL    ABS. EOSINOPHILS 0.0 0.0 - 0.4 K/UL    ABS. BASOPHILS 0.0 0.0 - 0.1 K/UL    ABS. IMM. GRANS. 0.0 0.00 - 0.04 K/UL    DF AUTOMATED         Radiologic Studies -   No orders to display     CT Results  (Last 48 hours)    None        CXR Results  (Last 48 hours)    None            Medical Decision Making   I am the first provider for this patient.     I reviewed the vital signs, available nursing notes, past medical history, past surgical history, family history and social history. Vital Signs-Reviewed the patient's vital signs. Patient Vitals for the past 12 hrs:   Temp Pulse Resp BP SpO2   07/04/22 0013 98.3 °F (36.8 °C) 64 18 (!) 141/78 100 %           Records Reviewed: Nursing Notes and Old Medical Records    Provider Notes (Medical Decision Making):   Frontal diagnosis: Gastritis, dehydration, electrolyte abnormality, pancreatitis, UTI  We will check CBC, CMP, lipase, UA, hCG    ED Course:   Initial assessment performed. The patients presenting problems have been discussed, and they are in agreement with the care plan formulated and outlined with them. I have encouraged them to ask questions as they arise throughout their visit. Progress Notes:   Labs reviewed. CBC is unremarkable. UA does not show any evidence of UTI, but spec gravity is 1.030 and there are 40 ketones suggesting moderate dehydration. CMP is unremarkable other than mild hyperglycemia with glucose of 116. Urine drug screen is positive for amphetamines and THC. Disposition:  [facility] home    PLAN:  1. Discharge Medication List as of 7/4/2022  4:43 AM      START taking these medications    Details   ondansetron hcl (Zofran) 4 mg tablet Take 1 Tablet by mouth every eight (8) hours as needed for Nausea., Normal, Disp-20 Tablet, R-0      famotidine (Pepcid) 20 mg tablet Take 1 Tablet by mouth two (2) times a day for 10 days. , Normal, Disp-20 Tablet, R-0         CONTINUE these medications which have NOT CHANGED    Details   ergocalciferol (ERGOCALCIFEROL) 50,000 unit capsule Take 1 Cap by mouth every seven (7) days. , Normal, Disp-8 Cap, R-0      norgestimate-ethinyl estradiol (MONONESSA, 28,) 0.25-35 mg-mcg tab Take 1 Tab by mouth daily. , Historical Med           2.    Follow-up Information     Follow up With Specialties Details Why Jose Elias Maxwell MD Internal Medicine Physician Schedule an appointment as soon as possible for a visit   [address] [place]  [phone]      [facility] EMERGENCY DEPT Emergency Medicine  If symptoms worsen [address] [place] [address]  [phone]        Return to ED if worse     Diagnosis     Clinical Impression:   1. Nausea and vomiting, unspecified vomiting type    2.  Acute gastritis without hemorrhage, unspecified gastritis type

## 2022-07-04 NOTE — Clinical Note
[address]  [facility] EMERGENCY DEPT  [address] [place] [address]  Nico Masters [id number] [phone]    Work/School Note    Date: 7/4/2022    To Whom It May concern:      nAtonia Orellana was seen and treated today in the emergency room by the following provider(s):  Attending Provider: Shima Navarro MD.      Antonia Orellana is excused from work/school on 07/04/22. She is clear to return to work/school on 07/05/22.         Sincerely,          Lance Beasley MD

## 2022-07-04 NOTE — Clinical Note
[address]  [facility] EMERGENCY DEPT  [address] [place] [address]  Cass Castro [address]  [phone]    Work/School Note    Date: 7/4/2022    To Whom It May concern:      Xochilt Parr was seen and treated today in the emergency room by the following provider(s):  Attending Provider: Shima Navarro MD.      Xochilt Parr is excused from work/school on 07/04/22. She is clear to return to work/school on 07/05/22.         Sincerely,          Una Pascual MD

## 2022-10-18 NOTE — PROGRESS NOTES
HISTORY OF PRESENT ILLNESS  Marita Schrader is a 42 y.o. female. HPI  Pt is here to re-establish care. Last here 17. This is an established visit completed with telemedicine was completed with video assist. The patient acknowledges and agrees to this method of visitation doxyme. PMHx:  BP at /84, though she was not feeling well  She will come in for BP check     Denies gestational diabetes     Wt today per pt is 187 lbs, down 195 lbs since last ER visit  States she has been doing intermittent fasting and taking probiotics     Reviewed labs from the ER  Ordered labs  Pt will come into clinic for labs    She presented to ED 19 after CO exposure while pregnant. Pt presented to ED 22 w/ vomiting. Given zoneo pepcid   She has been taking a probiotics since then     She saw a psychiatrist in , was rx'd wellbutrin  She felt like this was not helpful so she stopped taking it   No longer following w/ this physician    She has an IUD     FMHx:  Father -- high cholesterol  Mother -- none, lifetime smoker   Paternal grandmother -- Throat cancer  Maternal grandmother -- heart attack,     PSHx:  2 C sections,  and 2019  [place] teeth extraction    SHx:  Former smoker, does Vape socially -- electronic cigarette, trying to decrease this   Occasional alcohol, social drinking, wine with dinner sometimes   Not , long term partner with child's father  Two children,  and   Works at [employer] now     [address]  Colonoscopy: not yet needed  Dexa: not yet needed  Mammo: not yet needed  Pap: Dr. Bandar Bond, 10/21, due   Tdap: 2016, thinks this may have been updated in 2019 w/ pregnancy  [id number]: not yet needed  Shingrix: not yet needed  Flu shot: , not last year, due   Eye exam: , due   Lipids:    A1c:  5.2  Covid: 3 doses iCouch, had covid     There are no problems to display for this patient.    Current Outpatient Medications   Medication Sig Dispense Refill    ondansetron hcl (Zofran) 4 mg tablet Take 1 Tablet by mouth every eight (8) hours as needed for Nausea. 20 Tablet 0    ergocalciferol (ERGOCALCIFEROL) 50,000 unit capsule Take 1 Cap by mouth every seven (7) days. 8 Cap 0    norgestimate-ethinyl estradiol (MONONESSA, 28,) 0.25-35 mg-mcg tab Take 1 Tab by mouth daily. Past Surgical History:   Procedure Laterality Date    HX  SECTION      HX WISDOM TEETH EXTRACTION        Lab Results   Component Value Date/Time    WBC 8.2 2022 12:23 AM    HGB 12.4 2022 12:23 AM    HCT 37.5 2022 12:23 AM    PLATELET 174  12:23 AM    MCV 90.6 2022 12:23 AM     Lab Results   Component Value Date/Time    Cholesterol, total 276 (H) 2017 11:08 AM    HDL Cholesterol 113 2017 11:08 AM    LDL, calculated 150 (H) 2017 11:08 AM    Triglyceride 67 2017 11:08 AM     Lab Results   Component Value Date/Time    GFR est non-AA >60 2022 12:23 AM    GFR est AA >60 2022 12:23 AM    Creatinine 0.74 2022 12:23 AM    BUN 13 2022 12:23 AM    Sodium 136 2022 12:23 AM    Potassium 4.1 2022 12:23 AM    Chloride 105 2022 12:23 AM    CO2 24 2022 12:23 AM        Review of Systems   Constitutional:  Negative for chills and fever. HENT:  Negative for hearing loss and tinnitus. Eyes:  Negative for blurred vision and double vision. Respiratory:  Negative for shortness of breath and wheezing. Cardiovascular:  Negative for chest pain and palpitations. Gastrointestinal:  Negative for nausea and vomiting. Genitourinary:  Negative for dysuria and frequency. Musculoskeletal:  Negative for back pain, falls and joint pain. Skin:  Negative for itching and rash. Neurological:  Negative for dizziness, loss of consciousness and headaches. Psychiatric/Behavioral:  Negative for depression. The patient is not nervous/anxious.       Physical Exam  Constitutional: General: She is not in acute distress. Appearance: Normal appearance. She is not ill-appearing, toxic-appearing or diaphoretic. HENT:      Head: Normocephalic and atraumatic. Eyes:      General:         Right eye: No discharge. Left eye: No discharge. Conjunctiva/sclera: Conjunctivae normal.   Neurological:      General: No focal deficit present. Mental Status: She is alert and oriented to person, place, and time. Psychiatric:         Mood and Affect: Mood normal.         Behavior: Behavior normal.       ASSESSMENT and PLAN    ICD-10-CM ICD-9-CM    1. IFG (impaired fasting glucose)  R73.01 790.21 LIPID PANEL      METABOLIC PANEL, COMPREHENSIVE      HEMOGLOBIN A1C WITH EAG   Check A1c evaluate for diabetes work on weight loss   TSH 3RD GENERATION      CBC W/O DIFF      URINALYSIS W/ RFLX MICROSCOPIC      2. Mixed hyperlipidemia  E78.2 272.2 LIPID PANEL      METABOLIC PANEL, COMPREHENSIVE   Moderately elevated in 2017 repeat lipids today and assess for need for medication   HEMOGLOBIN A1C WITH EAG      TSH 3RD GENERATION      CBC W/O DIFF      URINALYSIS W/ RFLX MICROSCOPIC      3. Elevated BP without diagnosis of hypertension  R03.0 796.2 LIPID PANEL      METABOLIC PANEL, COMPREHENSIVE   Blood pressure was mildly elevated several times in the 635 systolic range she will come into clinic for blood pressure check can monitor blood pressure at home as well no medications for now check basic labs and will reassess once we have some additional data may need medication   HEMOGLOBIN A1C WITH EAG      TSH 3RD GENERATION      CBC W/O DIFF      URINALYSIS W/ RFLX MICROSCOPIC      4. Obesity (BMI 30.0-34. 9)  E66.9 278.00 LIPID PANEL      METABOLIC PANEL, COMPREHENSIVE      HEMOGLOBIN A1C WITH EAG   Continue with weight loss she is working on intermittent fasting for this   TSH 3RD GENERATION      CBC W/O DIFF      URINALYSIS W/ RFLX MICROSCOPIC        Depression screen reviewed and negative.   Scribed by Allie Hoyos of [facility], as dictated by Dr. Allen Niño. Current diagnosis and concerns discussed with pt at length. Pt understands risks and benefits or current treatment plan and medications, and accepts the treatment and medication with any possible risks. Pt asks appropriate questions, which were answered. Pt was instructed to call with any concerns or problems. I have reviewed the note documented by the scribe. The services provided are my own. The documentation is accurate. Erik Antonietta, who was evaluated through a synchronous (real-time) audio-video encounter, and/or her healthcare decision maker, is aware that it is a billable service, which includes applicable co-pays, with coverage as determined by her insurance carrier. She provided verbal consent to proceed and patient identification was verified. This visit was conducted pursuant to the emergency declaration under the [address] [place] [address], [address] [place] [address] authority and the [employer] Act. A caregiver was present when appropriate. Ability to conduct physical exam was limited.  The patient was located at: Home: [address] [place] [address]  The provider was located at: Home: [unfilled]    --Allie Hoyos on 10/18/2022 at 10:23 AM

## 2022-10-21 ENCOUNTER — VIRTUAL VISIT (OUTPATIENT)
Dept: INTERNAL MEDICINE CLINIC | Age: 37
End: 2022-10-21
Payer: COMMERCIAL

## 2022-10-21 DIAGNOSIS — R73.01 IFG (IMPAIRED FASTING GLUCOSE): Primary | ICD-10-CM

## 2022-10-21 DIAGNOSIS — E66.9 OBESITY (BMI 30.0-34.9): ICD-10-CM

## 2022-10-21 DIAGNOSIS — E78.2 MIXED HYPERLIPIDEMIA: ICD-10-CM

## 2022-10-21 DIAGNOSIS — R03.0 ELEVATED BP WITHOUT DIAGNOSIS OF HYPERTENSION: ICD-10-CM

## 2022-10-21 PROCEDURE — 99203 OFFICE O/P NEW LOW 30 MIN: CPT | Performed by: INTERNAL MEDICINE

## 2022-10-21 NOTE — PROGRESS NOTES
1. \"Have you been to the ER, urgent care clinic since your last visit? Hospitalized since your last visit? \" No    2. \"Have you seen or consulted any other health care providers outside of the [address] [place] [address] since your last visit? \" No     3. For patients aged 39-70: Has the patient had a colonoscopy / FIT/ Cologuard? NA - based on age      If the patient is female:    4. For patients aged 41-77: Has the patient had a mammogram within the past 2 years? NA - based on age or sex      11. For patients aged 21-65: Has the patient had a pap smear?  No

## 2022-11-25 RX ORDER — DOXYCYCLINE 100 MG/1
100 TABLET ORAL 2 TIMES DAILY
Qty: 14 TABLET | Refills: 0 | Status: SHIPPED | OUTPATIENT
Start: 2022-11-25 | End: 2022-12-02

## 2022-12-13 ENCOUNTER — LAB ONLY (OUTPATIENT)
Dept: INTERNAL MEDICINE CLINIC | Age: 37
End: 2022-12-13

## 2022-12-13 DIAGNOSIS — R73.01 IFG (IMPAIRED FASTING GLUCOSE): ICD-10-CM

## 2022-12-13 DIAGNOSIS — R03.0 ELEVATED BP WITHOUT DIAGNOSIS OF HYPERTENSION: ICD-10-CM

## 2022-12-13 DIAGNOSIS — E66.9 OBESITY (BMI 30.0-34.9): ICD-10-CM

## 2022-12-13 DIAGNOSIS — E78.2 MIXED HYPERLIPIDEMIA: ICD-10-CM

## 2022-12-14 LAB
ALBUMIN SERPL-MCNC: 4 G/DL (ref 3.5–5)
ALBUMIN/GLOB SERPL: 1.1 (ref 1.1–2.2)
ALP SERPL-CCNC: 53 U/L (ref 45–117)
ALT SERPL-CCNC: 20 U/L (ref 12–78)
ANION GAP SERPL CALC-SCNC: 4 MMOL/L (ref 5–15)
APPEARANCE UR: CLEAR
AST SERPL-CCNC: 20 U/L (ref 15–37)
BILIRUB SERPL-MCNC: 0.7 MG/DL (ref 0.2–1)
BILIRUB UR QL: NEGATIVE
BUN SERPL-MCNC: 12 MG/DL (ref 6–20)
BUN/CREAT SERPL: 14 (ref 12–20)
CALCIUM SERPL-MCNC: 9.2 MG/DL (ref 8.5–10.1)
CHLORIDE SERPL-SCNC: 107 MMOL/L (ref 97–108)
CHOLEST SERPL-MCNC: 273 MG/DL
CO2 SERPL-SCNC: 28 MMOL/L (ref 21–32)
COLOR UR: ABNORMAL
CREAT SERPL-MCNC: 0.85 MG/DL (ref 0.55–1.02)
ERYTHROCYTE [DISTWIDTH] IN BLOOD BY AUTOMATED COUNT: 12.8 % (ref 11.5–14.5)
EST. AVERAGE GLUCOSE BLD GHB EST-MCNC: 105 MG/DL
GLOBULIN SER CALC-MCNC: 3.5 G/DL (ref 2–4)
GLUCOSE SERPL-MCNC: 86 MG/DL (ref 65–100)
GLUCOSE UR STRIP.AUTO-MCNC: NEGATIVE MG/DL
HBA1C MFR BLD: 5.3 % (ref 4–5.6)
HCT VFR BLD AUTO: 37.6 % (ref 35–47)
HDLC SERPL-MCNC: 117 MG/DL
HDLC SERPL: 2.3 (ref 0–5)
HGB BLD-MCNC: 12.1 G/DL (ref 11.5–16)
HGB UR QL STRIP: NEGATIVE
KETONES UR QL STRIP.AUTO: ABNORMAL MG/DL
LDLC SERPL CALC-MCNC: 143.2 MG/DL (ref 0–100)
LEUKOCYTE ESTERASE UR QL STRIP.AUTO: NEGATIVE
MCH RBC QN AUTO: 31.3 PG (ref 26–34)
MCHC RBC AUTO-ENTMCNC: 32.2 G/DL (ref 30–36.5)
MCV RBC AUTO: 97.4 FL (ref 80–99)
NITRITE UR QL STRIP.AUTO: NEGATIVE
NRBC # BLD: 0 K/UL (ref 0–0.01)
NRBC BLD-RTO: 0 PER 100 WBC
PH UR STRIP: 6 (ref 5–8)
PLATELET # BLD AUTO: 341 K/UL (ref 150–400)
PMV BLD AUTO: 9.6 FL (ref 8.9–12.9)
POTASSIUM SERPL-SCNC: 4 MMOL/L (ref 3.5–5.1)
PROT SERPL-MCNC: 7.5 G/DL (ref 6.4–8.2)
PROT UR STRIP-MCNC: NEGATIVE MG/DL
RBC # BLD AUTO: 3.86 M/UL (ref 3.8–5.2)
SODIUM SERPL-SCNC: 139 MMOL/L (ref 136–145)
SP GR UR REFRACTOMETRY: 1.02 (ref 1–1.03)
TRIGL SERPL-MCNC: 64 MG/DL (ref ?–150)
TSH SERPL DL<=0.05 MIU/L-ACNC: 0.92 UIU/ML (ref 0.36–3.74)
UROBILINOGEN UR QL STRIP.AUTO: 0.2 EU/DL (ref 0.2–1)
VLDLC SERPL CALC-MCNC: 12.8 MG/DL
WBC # BLD AUTO: 4.6 K/UL (ref 3.6–11)

## 2023-04-17 NOTE — PROGRESS NOTES
HISTORY OF PRESENT ILLNESS  Jared Archuleta is a 42 y.o. female. HPI  Last here vv 10/21/22. Here for routine care. Pt reports occasional palpitations (a \"flip\" every few weeks) x 6 months that come out of nowhere and go away on their own. States she had experienced a few palpitations prior to her last labs. Of note, she drinks coffee. Denies SOB, CP. Will get baseline EKG. Will monitor, discussed significant caffeine and alcohol intake can exacerbate these, advised her to cut back on caffeine. States she has occasional indigestion. Takes probiotic. Advised avoiding tomato-based foods and laying down after eating. Discussed pepcid OTC if sx's worsen. BP today is 130/78      Wt today is 204 lbs, up 17 lbs since lov, up 10 lbs since    Discussed diet and wt/l   States she has been doing intermittent fasting, but not as much as before, will be getting back into it      Reviewed labs  Ordered pre-labs  Discussed slightly elevated LDL but since her HDL is also high and she is young, we will hold off on medication for now       She takes probiotics     She previously saw a psychiatrist in , was rx'd wellbutrin  She felt like this was not helpful so she stopped taking it   No longer following w/ this physician     She has an IUD       PREVENTIVE:  Colonoscopy: not yet needed  Dexa: not yet needed  Mammo: due , ordered, she can schedule after 38th bday  Pap: Dr. Christopher Hartman 4/3/23   Tdap: 2016, thinks this may have been updated in  w/ pregnancy  Lomqrew19: not yet needed  Shingrix: not yet needed  Flu shot: , declines this year  Eye exam: , due reminded   Lipids:       A1c:  5.2  5.3   Covid: 3 doses Kenroy Sheppard, had covid     There are no problems to display for this patient.      Past Surgical History:   Procedure Laterality Date    HX  SECTION      HX WISDOM TEETH EXTRACTION        Lab Results   Component Value Date/Time    WBC 4.6 2022 10:55 AM    HGB 12.1 12/13/2022 10:55 AM    HCT 37.6 12/13/2022 10:55 AM    PLATELET 525 01/51/8947 10:55 AM    MCV 97.4 12/13/2022 10:55 AM     Lab Results   Component Value Date/Time    Cholesterol, total 273 (H) 12/13/2022 10:55 AM    HDL Cholesterol 117 12/13/2022 10:55 AM    LDL, calculated 143.2 (H) 12/13/2022 10:55 AM    Triglyceride 64 12/13/2022 10:55 AM    CHOL/HDL Ratio 2.3 12/13/2022 10:55 AM     Lab Results   Component Value Date/Time    GFR est non-AA >60 07/04/2022 12:23 AM    GFR est AA >60 07/04/2022 12:23 AM    Creatinine 0.85 12/13/2022 10:55 AM    BUN 12 12/13/2022 10:55 AM    Sodium 139 12/13/2022 10:55 AM    Potassium 4.0 12/13/2022 10:55 AM    Chloride 107 12/13/2022 10:55 AM    CO2 28 12/13/2022 10:55 AM        Review of Systems   Respiratory:  Negative for shortness of breath. Cardiovascular:  Positive for palpitations. Negative for chest pain. Physical Exam  Constitutional:       General: She is not in acute distress. Appearance: She is not ill-appearing, toxic-appearing or diaphoretic. HENT:      Head: Normocephalic and atraumatic. Right Ear: External ear normal.      Left Ear: External ear normal.   Eyes:      General:         Right eye: No discharge. Left eye: No discharge. Conjunctiva/sclera: Conjunctivae normal.      Pupils: Pupils are equal, round, and reactive to light. Neck:      Vascular: No carotid bruit. Cardiovascular:      Rate and Rhythm: Normal rate and regular rhythm. Pulses: Normal pulses. Heart sounds: No murmur heard. No friction rub. No gallop. Pulmonary:      Effort: No respiratory distress. Breath sounds: Normal breath sounds. No wheezing or rales. Chest:      Chest wall: No tenderness. Musculoskeletal:         General: Normal range of motion. Cervical back: Normal.      Right lower leg: No edema. Left lower leg: No edema. Skin:     General: Skin is warm and dry.    Neurological:      Mental Status: She is oriented to person, place, and time. Mental status is at baseline. Coordination: Coordination normal.      Gait: Gait normal.   Psychiatric:         Mood and Affect: Mood normal.         Behavior: Behavior normal.       ASSESSMENT and PLAN    ICD-10-CM ICD-9-CM    1. Physical exam  Z00.00 V70.9 HEPATITIS C AB      LIPID PANEL   Remarkable for        Weight loss    Mammogram will be due in September when she turns 38 will order mammogram for her     pelvic exam is up-to-date will report review     colonoscopy will be due age 37     she did not do flu shot this year will consider in the fall Tdap up-to-date eye exam is due has had COVID-vaccine   METABOLIC PANEL, COMPREHENSIVE      HEMOGLOBIN A1C WITH EAG      TSH 3RD GENERATION      CBC W/O DIFF      2. Obesity (BMI 35.0-39.9 without comorbidity)  E66.9 278.00 HEPATITIS C AB      LIPID PANEL      METABOLIC PANEL, COMPREHENSIVE   Work on portions weight has climbed since last office visit she plans on getting back on track with testing and portion control   HEMOGLOBIN A1C WITH EAG      TSH 3RD GENERATION      CBC W/O DIFF      3. Palpitation  R00.2 785.1 HEPATITIS C AB      LIPID PANEL   EKG is normal today    Patient with infrequent brief palpitations which are consistent with a PAC or PVC    Labs were already completed in December she was having the symptoms at the time    Symptoms appear benign discussed reducing caffeine intake if they become progressive we will set up with cardiology for further evaluation   METABOLIC PANEL, COMPREHENSIVE      HEMOGLOBIN A1C WITH EAG      TSH 3RD GENERATION      CBC W/O DIFF      Depression screen reviewed and negative. Scribed by Eduar Simental, as dictated by Dr. Tash Azevedo. Current diagnosis and concerns discussed with pt at length. Pt understands risks and benefits or current treatment plan and medications, and accepts the treatment and medication with any possible risks.  Pt asks appropriate questions, which were answered. Pt was instructed to call with any concerns or problems. I have reviewed the note documented by the scribe. The services provided are my own. The documentation is accurate.

## 2023-04-18 ENCOUNTER — OFFICE VISIT (OUTPATIENT)
Dept: INTERNAL MEDICINE CLINIC | Age: 38
End: 2023-04-18
Payer: COMMERCIAL

## 2023-04-18 VITALS
DIASTOLIC BLOOD PRESSURE: 78 MMHG | RESPIRATION RATE: 16 BRPM | HEART RATE: 70 BPM | BODY MASS INDEX: 34.83 KG/M2 | SYSTOLIC BLOOD PRESSURE: 130 MMHG | WEIGHT: 204 LBS | OXYGEN SATURATION: 100 % | TEMPERATURE: 98.2 F | HEIGHT: 64 IN

## 2023-04-18 DIAGNOSIS — Z00.00 PHYSICAL EXAM: Primary | ICD-10-CM

## 2023-04-18 DIAGNOSIS — R00.2 PALPITATION: ICD-10-CM

## 2023-04-18 DIAGNOSIS — E66.9 OBESITY (BMI 35.0-39.9 WITHOUT COMORBIDITY): ICD-10-CM

## 2023-04-18 PROCEDURE — 99395 PREV VISIT EST AGE 18-39: CPT | Performed by: INTERNAL MEDICINE

## 2023-04-18 PROCEDURE — 93000 ELECTROCARDIOGRAM COMPLETE: CPT | Performed by: INTERNAL MEDICINE

## 2023-04-18 NOTE — PROGRESS NOTES
1. \"Have you been to the ER, urgent care clinic since your last visit? Hospitalized since your last visit? \" No    2. \"Have you seen or consulted any other health care providers outside of the [address] [place] [address] since your last visit? \" No     3. For patients aged 39-70: Has the patient had a colonoscopy / FIT/ Cologuard? NA - based on age      If the patient is female:    4. For patients aged 41-77: Has the patient had a mammogram within the past 2 years? NA - based on age or sex      11. For patients aged 21-65: Has the patient had a pap smear?  No

## 2023-04-23 DIAGNOSIS — Z00.00 PHYSICAL EXAM: Primary | ICD-10-CM

## 2023-05-17 ENCOUNTER — TRANSCRIBE ORDERS (OUTPATIENT)
Facility: HOSPITAL | Age: 38
End: 2023-05-17

## 2023-05-17 DIAGNOSIS — Z00.00 PHYSICAL EXAM: Primary | ICD-10-CM

## 2024-03-28 ENCOUNTER — HOSPITAL ENCOUNTER (OUTPATIENT)
Facility: HOSPITAL | Age: 39
Discharge: HOME | End: 2024-03-28
Attending: INTERNAL MEDICINE
Payer: COMMERCIAL

## 2024-03-28 VITALS — HEIGHT: 64 IN | BODY MASS INDEX: 34.82 KG/M2 | WEIGHT: 203.93 LBS

## 2024-03-28 DIAGNOSIS — Z00.00 PHYSICAL EXAM: ICD-10-CM

## 2024-03-28 PROCEDURE — 77067 SCR MAMMO BI INCL CAD: CPT

## 2025-02-04 NOTE — PROGRESS NOTES
Lipids elevated--diet/wt loss to improve    vit D is low--the patient needs 50,000 units weekly for 8 weeks then start a daily 2000unit supplement instead.     Rest labs fine Patient here today for nurse blood pressure check.  Last dose of BP medication taken:  2/4/2025 at 5:00 am    BP Readings from Last 1 Encounters:   02/04/25 0858 138/88   02/04/25 0840 (!) 138/92     Pulse Readings from Last 1 Encounters:   02/04/25 84     Patient Reported Vitals           No data to display                 Last Clinician Visit for this condition: 1/20/2025  Per that visit, plan of care: HTN, goal below 140/90 - BP is elevated. Discussed increasing her Lisinopril-hydrochlorothiazide to 20-12.5 mg and she was in agreement. Prescribed Lisinopril-hydrochlorothiazide 12.5 mg. I briefly reviewed the mechanism of action of the medication prescribed today. I also reviewed the most common side effects she may experience. I have advised her to discontinue the medication and call me immediately if she experiences any severe symptoms after starting. Will have her follow up in 2 weeks for BP recheck with RN or sooner with new or worsening symptoms.   Next office visit is scheduled for: 6/13/2025    Current BP Medications are: lisinopril-hctz 20-12.5 mg     Please review and advise if there are any changes to current plan of care.  Next Nurse BP visit scheduled: No    Pt denies chest pain, SOB, dizziness, HA, visual changes or numbness/tingling in the extremities.    PCP recommends lisinopril-hctz 20-25 mg and f/u with PCP at appointment on 6/13/2025. Writer stated PCP recommendations and pt verbalized understanding and is in agreement. Writer encouraged pt to continue to monitor BP at home and update our office. Pt verbalized understanding.    Writer advised pt if she begins to have any chest pain, SOB, new or worsening symptoms she should have someone drive her to the ER to be evaluated. Pt verbalized understanding and stated no further questions at this time.

## 2025-06-06 ENCOUNTER — TRANSCRIBE ORDERS (OUTPATIENT)
Facility: HOSPITAL | Age: 40
End: 2025-06-06

## 2025-06-06 DIAGNOSIS — Z12.31 VISIT FOR SCREENING MAMMOGRAM: Primary | ICD-10-CM

## 2025-06-18 ENCOUNTER — OFFICE VISIT (OUTPATIENT)
Age: 40
End: 2025-06-18
Payer: COMMERCIAL

## 2025-06-18 VITALS
DIASTOLIC BLOOD PRESSURE: 75 MMHG | RESPIRATION RATE: 15 BRPM | HEIGHT: 64 IN | OXYGEN SATURATION: 100 % | SYSTOLIC BLOOD PRESSURE: 121 MMHG | WEIGHT: 201.4 LBS | BODY MASS INDEX: 34.38 KG/M2 | HEART RATE: 65 BPM | TEMPERATURE: 98.1 F

## 2025-06-18 DIAGNOSIS — E78.00 PURE HYPERCHOLESTEROLEMIA: ICD-10-CM

## 2025-06-18 DIAGNOSIS — Z00.00 PHYSICAL EXAM: Primary | ICD-10-CM

## 2025-06-18 PROCEDURE — 99396 PREV VISIT EST AGE 40-64: CPT | Performed by: INTERNAL MEDICINE

## 2025-06-18 RX ORDER — LEVONORGESTREL 52 MG/1
1 INTRAUTERINE DEVICE INTRAUTERINE ONCE
COMMUNITY

## 2025-06-18 SDOH — ECONOMIC STABILITY: FOOD INSECURITY: WITHIN THE PAST 12 MONTHS, YOU WORRIED THAT YOUR FOOD WOULD RUN OUT BEFORE YOU GOT MONEY TO BUY MORE.: NEVER TRUE

## 2025-06-18 SDOH — ECONOMIC STABILITY: FOOD INSECURITY: WITHIN THE PAST 12 MONTHS, THE FOOD YOU BOUGHT JUST DIDN'T LAST AND YOU DIDN'T HAVE MONEY TO GET MORE.: NEVER TRUE

## 2025-06-18 ASSESSMENT — PATIENT HEALTH QUESTIONNAIRE - PHQ9
1. LITTLE INTEREST OR PLEASURE IN DOING THINGS: NOT AT ALL
2. FEELING DOWN, DEPRESSED OR HOPELESS: NOT AT ALL
SUM OF ALL RESPONSES TO PHQ QUESTIONS 1-9: 0

## 2025-06-18 NOTE — PROGRESS NOTES
Have you been to the ER, urgent care clinic since your last visit?  Hospitalized since your last visit?   NO    Have you seen or consulted any other health care providers outside our system since your last visit?   NO     “Have you had a pap smear?”    YES - Where: Dr. [facility] to request most recent records if not in the chart    No cervical cancer screening on file            
exercise avoiding caloric beverages    Blood pressure good    Labs doing well    Colonoscopy at age 43    Mammogram is scheduled pelvic exam is up-to-date eye exam is due declines COVID shot booster declines flu shot    Tdap up-to-date Lipid Panel    TSH    Hepatitis C Antibody    HIV 1/2 Ag/Ab, 4TH Generation,W Rflx Confirm      2. Pure hypercholesterolemia     Diet controlled check lipids    I have reviewed the note documented by the scribe.  The services provided are my own.  The documentation is accurate     Depression screen reviewed and negative.  Scribed by Daniela Ross of [place], as dictated by Dr. Jing Robertson.    Current diagnosis and concerns discussed with pt at length. Pt understands risks and benefits or current treatment plan and medications, and accepts the treatment and medication with any possible risks. Pt asks appropriate questions, which were answered. Pt was instructed to call with any concerns or problems.    I have reviewed the note documented by the scribe. The services provided are my own. The documentation is accurate.

## 2025-06-20 LAB
ALBUMIN SERPL-MCNC: 3.8 G/DL (ref 3.5–5)
ALBUMIN/GLOB SERPL: 1.1 (ref 1.1–2.2)
ALP SERPL-CCNC: 50 U/L (ref 45–117)
ALT SERPL-CCNC: 17 U/L (ref 12–78)
ANION GAP SERPL CALC-SCNC: 5 MMOL/L (ref 2–12)
AST SERPL-CCNC: 20 U/L (ref 15–37)
BILIRUB SERPL-MCNC: 0.4 MG/DL (ref 0.2–1)
BUN SERPL-MCNC: 13 MG/DL (ref 6–20)
BUN/CREAT SERPL: 15 (ref 12–20)
CALCIUM SERPL-MCNC: 9 MG/DL (ref 8.5–10.1)
CHLORIDE SERPL-SCNC: 103 MMOL/L (ref 97–108)
CHOLEST SERPL-MCNC: 285 MG/DL
CO2 SERPL-SCNC: 27 MMOL/L (ref 21–32)
CREAT SERPL-MCNC: 0.84 MG/DL (ref 0.55–1.02)
ERYTHROCYTE [DISTWIDTH] IN BLOOD BY AUTOMATED COUNT: 13 % (ref 11.5–14.5)
EST. AVERAGE GLUCOSE BLD GHB EST-MCNC: 105 MG/DL
GLOBULIN SER CALC-MCNC: 3.4 G/DL (ref 2–4)
GLUCOSE SERPL-MCNC: 85 MG/DL (ref 65–100)
HBA1C MFR BLD: 5.3 % (ref 4–5.6)
HCT VFR BLD AUTO: 39 % (ref 35–47)
HCV AB SER IA-ACNC: 0.1 INDEX
HCV AB SERPL QL IA: NONREACTIVE
HDLC SERPL-MCNC: 140 MG/DL
HDLC SERPL: 2 (ref 0–5)
HGB BLD-MCNC: 11.8 G/DL (ref 11.5–16)
HIV 1+2 AB+HIV1 P24 AG SERPL QL IA: NONREACTIVE
HIV 1/2 RESULT COMMENT: NORMAL
LDLC SERPL CALC-MCNC: 133.6 MG/DL (ref 0–100)
MCH RBC QN AUTO: 30.3 PG (ref 26–34)
MCHC RBC AUTO-ENTMCNC: 30.3 G/DL (ref 30–36.5)
MCV RBC AUTO: 100 FL (ref 80–99)
NRBC # BLD: 0 K/UL (ref 0–0.01)
NRBC BLD-RTO: 0 PER 100 WBC
PLATELET # BLD AUTO: 292 K/UL (ref 150–400)
PMV BLD AUTO: 9.7 FL (ref 8.9–12.9)
POTASSIUM SERPL-SCNC: 4.3 MMOL/L (ref 3.5–5.1)
PROT SERPL-MCNC: 7.2 G/DL (ref 6.4–8.2)
RBC # BLD AUTO: 3.9 M/UL (ref 3.8–5.2)
SODIUM SERPL-SCNC: 135 MMOL/L (ref 136–145)
TRIGL SERPL-MCNC: 57 MG/DL
TSH SERPL DL<=0.05 MIU/L-ACNC: 0.73 UIU/ML (ref 0.36–3.74)
VLDLC SERPL CALC-MCNC: 11.4 MG/DL
WBC # BLD AUTO: 5.3 K/UL (ref 3.6–11)

## 2025-06-21 ENCOUNTER — RESULTS FOLLOW-UP (OUTPATIENT)
Age: 40
End: 2025-06-21

## 2025-08-15 ENCOUNTER — HOSPITAL ENCOUNTER (OUTPATIENT)
Facility: HOSPITAL | Age: 40
Discharge: HOME | End: 2025-08-18
Attending: INTERNAL MEDICINE
Payer: COMMERCIAL

## 2025-08-15 DIAGNOSIS — Z12.31 VISIT FOR SCREENING MAMMOGRAM: ICD-10-CM

## 2025-08-15 PROCEDURE — 77063 BREAST TOMOSYNTHESIS BI: CPT

## 2025-08-20 ENCOUNTER — HOSPITAL ENCOUNTER (OUTPATIENT)
Facility: HOSPITAL | Age: 40
Discharge: HOME | End: 2025-08-23
Attending: INTERNAL MEDICINE
Payer: COMMERCIAL

## 2025-08-20 DIAGNOSIS — R92.8 ABNORMAL MAMMOGRAM: ICD-10-CM

## 2025-08-20 PROCEDURE — G0279 TOMOSYNTHESIS, MAMMO: HCPCS

## 2025-08-20 PROCEDURE — 76642 ULTRASOUND BREAST LIMITED: CPT

## 2025-09-03 ENCOUNTER — TELEPHONE (OUTPATIENT)
Facility: HOSPITAL | Age: 40
End: 2025-09-03

## 2025-09-04 ENCOUNTER — HOSPITAL ENCOUNTER (OUTPATIENT)
Facility: HOSPITAL | Age: 40
End: 2025-09-04
Attending: INTERNAL MEDICINE
Payer: COMMERCIAL

## 2025-09-04 ENCOUNTER — HOSPITAL ENCOUNTER (OUTPATIENT)
Facility: HOSPITAL | Age: 40
Discharge: HOME | End: 2025-09-04
Attending: INTERNAL MEDICINE
Payer: COMMERCIAL

## 2025-09-04 DIAGNOSIS — N63.13 BREAST LUMP ON RIGHT SIDE AT 8 O'CLOCK POSITION: ICD-10-CM

## 2025-09-04 DIAGNOSIS — R92.8 ABNORMAL MAMMOGRAM OF RIGHT BREAST: ICD-10-CM

## 2025-09-04 PROCEDURE — 88341 IMHCHEM/IMCYTCHM EA ADD ANTB: CPT

## 2025-09-04 PROCEDURE — 88360 TUMOR IMMUNOHISTOCHEM/MANUAL: CPT

## 2025-09-04 PROCEDURE — 88342 IMHCHEM/IMCYTCHM 1ST ANTB: CPT

## 2025-09-04 PROCEDURE — 77065 DX MAMMO INCL CAD UNI: CPT

## 2025-09-04 PROCEDURE — 2709999900 US BREAST BIOPSY W LOC DEVICE 1ST LESION RIGHT

## 2025-09-04 PROCEDURE — 88305 TISSUE EXAM BY PATHOLOGIST: CPT

## 2025-09-04 RX ORDER — LIDOCAINE HYDROCHLORIDE AND EPINEPHRINE 10; 10 MG/ML; UG/ML
20 INJECTION, SOLUTION INFILTRATION; PERINEURAL ONCE
Status: DISPENSED | OUTPATIENT
Start: 2025-09-04

## 2025-09-04 RX ORDER — LIDOCAINE HYDROCHLORIDE 10 MG/ML
10 INJECTION, SOLUTION EPIDURAL; INFILTRATION; INTRACAUDAL; PERINEURAL ONCE
Status: DISPENSED | OUTPATIENT
Start: 2025-09-04